# Patient Record
Sex: FEMALE
[De-identification: names, ages, dates, MRNs, and addresses within clinical notes are randomized per-mention and may not be internally consistent; named-entity substitution may affect disease eponyms.]

---

## 2021-10-26 ENCOUNTER — NURSE TRIAGE (OUTPATIENT)
Dept: OTHER | Facility: CLINIC | Age: 46
End: 2021-10-26

## 2021-10-26 NOTE — TELEPHONE ENCOUNTER
Reason for Disposition   Elbow injury is main concern   [1] Numbness (i.e., loss of sensation) in fingers AND [2] present now    Answer Assessment - Initial Assessment Questions  1. MECHANISM: \"How did the injury happen? \"      Patient fell down while ice skating    2. ONSET: \"When did the injury happen? \" (Minutes or hours ago)       15 minutes prior to call    3. LOCATION: \"What part of the elbow is the injured? \"       Center of elbow    4. APPEARANCE of INJURY: \"What does the injury look like? \"       Mild swelling around elbow    5. SEVERITY: \"Can you use the elbow normally? \"  \"Can you bend it and straighten it fully? \"      Patient is unable to straighten her arm but can bend it    6. SIZE: For cuts, bruises, or swelling, ask: \"How large is it? \" (e.g., inches or centimeters; entire joint)       Denies cuts, bruises    7. PAIN: \"Is there pain? \" If so, ask: \"How bad is the pain? \"    (Scale 1-10; or mild, moderate, severe)      7/10    8. TETANUS: For any breaks in the skin, ask: \"When was the last tetanus booster? \"      N/a    9. OTHER SYMPTOMS: \"Do you have any other symptoms? \"  (e.g., numbness in hand)      Numbness in hand, nausea     10. PREGNANCY: \"Is there any chance you are pregnant? \" \"When was your last menstrual period? \"  n/a    Protocols used: ARM INJURY-ADULT-, ELBOW INJURY-ADULT-